# Patient Record
Sex: FEMALE | Race: BLACK OR AFRICAN AMERICAN | NOT HISPANIC OR LATINO
[De-identification: names, ages, dates, MRNs, and addresses within clinical notes are randomized per-mention and may not be internally consistent; named-entity substitution may affect disease eponyms.]

---

## 2019-05-30 ENCOUNTER — RESULT REVIEW (OUTPATIENT)
Age: 54
End: 2019-05-30

## 2019-06-18 ENCOUNTER — RESULT REVIEW (OUTPATIENT)
Age: 54
End: 2019-06-18

## 2019-07-16 PROBLEM — Z00.00 ENCOUNTER FOR PREVENTIVE HEALTH EXAMINATION: Status: ACTIVE | Noted: 2019-07-16

## 2019-07-24 ENCOUNTER — APPOINTMENT (OUTPATIENT)
Dept: OBGYN | Facility: HOSPITAL | Age: 54
End: 2019-07-24
Payer: MEDICAID

## 2019-07-24 ENCOUNTER — OUTPATIENT (OUTPATIENT)
Dept: OUTPATIENT SERVICES | Facility: HOSPITAL | Age: 54
LOS: 1 days | End: 2019-07-24

## 2019-07-24 VITALS
HEIGHT: 64 IN | SYSTOLIC BLOOD PRESSURE: 134 MMHG | BODY MASS INDEX: 45.92 KG/M2 | WEIGHT: 268.96 LBS | HEART RATE: 67 BPM | DIASTOLIC BLOOD PRESSURE: 82 MMHG

## 2019-07-24 PROCEDURE — XXXXX: CPT

## 2019-07-25 ENCOUNTER — APPOINTMENT (OUTPATIENT)
Dept: ULTRASOUND IMAGING | Facility: HOSPITAL | Age: 54
End: 2019-07-25
Payer: MEDICAID

## 2019-07-25 ENCOUNTER — OUTPATIENT (OUTPATIENT)
Dept: OUTPATIENT SERVICES | Facility: HOSPITAL | Age: 54
LOS: 1 days | End: 2019-07-25
Payer: MEDICAID

## 2019-07-25 DIAGNOSIS — D25.1 INTRAMURAL LEIOMYOMA OF UTERUS: ICD-10-CM

## 2019-07-25 PROCEDURE — 58340 CATHETER FOR HYSTEROGRAPHY: CPT

## 2019-07-25 PROCEDURE — 76831 ECHO EXAM UTERUS: CPT

## 2019-07-25 PROCEDURE — 76377 3D RENDER W/INTRP POSTPROCES: CPT

## 2019-07-25 PROCEDURE — 76831 ECHO EXAM UTERUS: CPT | Mod: 26

## 2019-07-26 DIAGNOSIS — D21.9 BENIGN NEOPLASM OF CONNECTIVE AND OTHER SOFT TISSUE, UNSPECIFIED: ICD-10-CM

## 2019-08-06 ENCOUNTER — OUTPATIENT (OUTPATIENT)
Dept: OUTPATIENT SERVICES | Facility: HOSPITAL | Age: 54
LOS: 1 days | End: 2019-08-06

## 2019-08-06 ENCOUNTER — APPOINTMENT (OUTPATIENT)
Dept: OBGYN | Facility: HOSPITAL | Age: 54
End: 2019-08-06
Payer: MEDICAID

## 2019-08-06 VITALS
BODY MASS INDEX: 46.29 KG/M2 | WEIGHT: 271.17 LBS | HEART RATE: 77 BPM | SYSTOLIC BLOOD PRESSURE: 143 MMHG | DIASTOLIC BLOOD PRESSURE: 76 MMHG | HEIGHT: 64 IN

## 2019-08-06 DIAGNOSIS — N95.0 POSTMENOPAUSAL BLEEDING: ICD-10-CM

## 2019-08-06 DIAGNOSIS — D25.1 INTRAMURAL LEIOMYOMA OF UTERUS: ICD-10-CM

## 2019-08-06 PROCEDURE — 99213 OFFICE O/P EST LOW 20 MIN: CPT | Mod: GC

## 2019-08-07 DIAGNOSIS — N95.0 POSTMENOPAUSAL BLEEDING: ICD-10-CM

## 2019-08-07 DIAGNOSIS — D25.1 INTRAMURAL LEIOMYOMA OF UTERUS: ICD-10-CM

## 2019-08-19 ENCOUNTER — TRANSCRIPTION ENCOUNTER (OUTPATIENT)
Age: 54
End: 2019-08-19

## 2019-08-19 ENCOUNTER — OUTPATIENT (OUTPATIENT)
Dept: OUTPATIENT SERVICES | Facility: HOSPITAL | Age: 54
LOS: 1 days | End: 2019-08-19
Payer: MEDICAID

## 2019-08-19 VITALS
HEART RATE: 69 BPM | WEIGHT: 270.07 LBS | TEMPERATURE: 98 F | SYSTOLIC BLOOD PRESSURE: 140 MMHG | OXYGEN SATURATION: 98 % | RESPIRATION RATE: 15 BRPM | DIASTOLIC BLOOD PRESSURE: 80 MMHG | HEIGHT: 64 IN

## 2019-08-19 DIAGNOSIS — Z98.891 HISTORY OF UTERINE SCAR FROM PREVIOUS SURGERY: Chronic | ICD-10-CM

## 2019-08-19 DIAGNOSIS — D25.1 INTRAMURAL LEIOMYOMA OF UTERUS: ICD-10-CM

## 2019-08-19 DIAGNOSIS — Z98.890 OTHER SPECIFIED POSTPROCEDURAL STATES: Chronic | ICD-10-CM

## 2019-08-19 LAB
ALBUMIN SERPL ELPH-MCNC: 3.9 G/DL — SIGNIFICANT CHANGE UP (ref 3.3–5)
ALP SERPL-CCNC: 55 U/L — SIGNIFICANT CHANGE UP (ref 40–120)
ALT FLD-CCNC: 15 U/L — SIGNIFICANT CHANGE UP (ref 4–33)
ANION GAP SERPL CALC-SCNC: 13 MMO/L — SIGNIFICANT CHANGE UP (ref 7–14)
ANION GAP SERPL CALC-SCNC: 13 MMO/L — SIGNIFICANT CHANGE UP (ref 7–14)
AST SERPL-CCNC: 15 U/L — SIGNIFICANT CHANGE UP (ref 4–32)
BASOPHILS # BLD AUTO: 0.05 K/UL — SIGNIFICANT CHANGE UP (ref 0–0.2)
BASOPHILS NFR BLD AUTO: 1.2 % — SIGNIFICANT CHANGE UP (ref 0–2)
BILIRUB DIRECT SERPL-MCNC: < 0.2 MG/DL — SIGNIFICANT CHANGE UP (ref 0.1–0.2)
BILIRUB SERPL-MCNC: 0.7 MG/DL — SIGNIFICANT CHANGE UP (ref 0.2–1.2)
BLD GP AB SCN SERPL QL: NEGATIVE — SIGNIFICANT CHANGE UP
BUN SERPL-MCNC: 12 MG/DL — SIGNIFICANT CHANGE UP (ref 7–23)
BUN SERPL-MCNC: 12 MG/DL — SIGNIFICANT CHANGE UP (ref 7–23)
CALCIUM SERPL-MCNC: 9.1 MG/DL — SIGNIFICANT CHANGE UP (ref 8.4–10.5)
CALCIUM SERPL-MCNC: 9.1 MG/DL — SIGNIFICANT CHANGE UP (ref 8.4–10.5)
CHLORIDE SERPL-SCNC: 101 MMOL/L — SIGNIFICANT CHANGE UP (ref 98–107)
CHLORIDE SERPL-SCNC: 101 MMOL/L — SIGNIFICANT CHANGE UP (ref 98–107)
CO2 SERPL-SCNC: 27 MMOL/L — SIGNIFICANT CHANGE UP (ref 22–31)
CO2 SERPL-SCNC: 27 MMOL/L — SIGNIFICANT CHANGE UP (ref 22–31)
CREAT SERPL-MCNC: 0.62 MG/DL — SIGNIFICANT CHANGE UP (ref 0.5–1.3)
CREAT SERPL-MCNC: 0.62 MG/DL — SIGNIFICANT CHANGE UP (ref 0.5–1.3)
EOSINOPHIL # BLD AUTO: 0.12 K/UL — SIGNIFICANT CHANGE UP (ref 0–0.5)
EOSINOPHIL NFR BLD AUTO: 2.9 % — SIGNIFICANT CHANGE UP (ref 0–6)
GLUCOSE SERPL-MCNC: 110 MG/DL — HIGH (ref 70–99)
GLUCOSE SERPL-MCNC: 110 MG/DL — HIGH (ref 70–99)
HBA1C BLD-MCNC: 5.6 % — SIGNIFICANT CHANGE UP (ref 4–5.6)
HCG SERPL-ACNC: < 5 MIU/ML — SIGNIFICANT CHANGE UP
HCT VFR BLD CALC: 41.4 % — SIGNIFICANT CHANGE UP (ref 34.5–45)
HCT VFR BLD CALC: 41.4 % — SIGNIFICANT CHANGE UP (ref 34.5–45)
HGB BLD-MCNC: 12.9 G/DL — SIGNIFICANT CHANGE UP (ref 11.5–15.5)
HGB BLD-MCNC: 12.9 G/DL — SIGNIFICANT CHANGE UP (ref 11.5–15.5)
IMM GRANULOCYTES NFR BLD AUTO: 0.5 % — SIGNIFICANT CHANGE UP (ref 0–1.5)
LYMPHOCYTES # BLD AUTO: 1.38 K/UL — SIGNIFICANT CHANGE UP (ref 1–3.3)
LYMPHOCYTES # BLD AUTO: 33.1 % — SIGNIFICANT CHANGE UP (ref 13–44)
MCHC RBC-ENTMCNC: 28 PG — SIGNIFICANT CHANGE UP (ref 27–34)
MCHC RBC-ENTMCNC: 28 PG — SIGNIFICANT CHANGE UP (ref 27–34)
MCHC RBC-ENTMCNC: 31.2 % — LOW (ref 32–36)
MCHC RBC-ENTMCNC: 31.2 % — LOW (ref 32–36)
MCV RBC AUTO: 89.8 FL — SIGNIFICANT CHANGE UP (ref 80–100)
MCV RBC AUTO: 89.8 FL — SIGNIFICANT CHANGE UP (ref 80–100)
MONOCYTES # BLD AUTO: 0.33 K/UL — SIGNIFICANT CHANGE UP (ref 0–0.9)
MONOCYTES NFR BLD AUTO: 7.9 % — SIGNIFICANT CHANGE UP (ref 2–14)
NEUTROPHILS # BLD AUTO: 2.27 K/UL — SIGNIFICANT CHANGE UP (ref 1.8–7.4)
NEUTROPHILS NFR BLD AUTO: 54.4 % — SIGNIFICANT CHANGE UP (ref 43–77)
NRBC # FLD: 0 K/UL — SIGNIFICANT CHANGE UP (ref 0–0)
NRBC # FLD: 0 K/UL — SIGNIFICANT CHANGE UP (ref 0–0)
PLATELET # BLD AUTO: 234 K/UL — SIGNIFICANT CHANGE UP (ref 150–400)
PLATELET # BLD AUTO: 234 K/UL — SIGNIFICANT CHANGE UP (ref 150–400)
PMV BLD: 12.5 FL — SIGNIFICANT CHANGE UP (ref 7–13)
PMV BLD: 12.5 FL — SIGNIFICANT CHANGE UP (ref 7–13)
POTASSIUM SERPL-MCNC: 3.9 MMOL/L — SIGNIFICANT CHANGE UP (ref 3.5–5.3)
POTASSIUM SERPL-MCNC: 3.9 MMOL/L — SIGNIFICANT CHANGE UP (ref 3.5–5.3)
POTASSIUM SERPL-SCNC: 3.9 MMOL/L — SIGNIFICANT CHANGE UP (ref 3.5–5.3)
POTASSIUM SERPL-SCNC: 3.9 MMOL/L — SIGNIFICANT CHANGE UP (ref 3.5–5.3)
PROT SERPL-MCNC: 7.2 G/DL — SIGNIFICANT CHANGE UP (ref 6–8.3)
RBC # BLD: 4.61 M/UL — SIGNIFICANT CHANGE UP (ref 3.8–5.2)
RBC # BLD: 4.61 M/UL — SIGNIFICANT CHANGE UP (ref 3.8–5.2)
RBC # FLD: 12.6 % — SIGNIFICANT CHANGE UP (ref 10.3–14.5)
RBC # FLD: 12.6 % — SIGNIFICANT CHANGE UP (ref 10.3–14.5)
RH IG SCN BLD-IMP: POSITIVE — SIGNIFICANT CHANGE UP
SODIUM SERPL-SCNC: 141 MMOL/L — SIGNIFICANT CHANGE UP (ref 135–145)
SODIUM SERPL-SCNC: 141 MMOL/L — SIGNIFICANT CHANGE UP (ref 135–145)
WBC # BLD: 4.17 K/UL — SIGNIFICANT CHANGE UP (ref 3.8–10.5)
WBC # BLD: 4.17 K/UL — SIGNIFICANT CHANGE UP (ref 3.8–10.5)
WBC # FLD AUTO: 4.17 K/UL — SIGNIFICANT CHANGE UP (ref 3.8–10.5)
WBC # FLD AUTO: 4.17 K/UL — SIGNIFICANT CHANGE UP (ref 3.8–10.5)

## 2019-08-19 PROCEDURE — 93010 ELECTROCARDIOGRAM REPORT: CPT

## 2019-08-19 RX ORDER — SODIUM CHLORIDE 9 MG/ML
3 INJECTION INTRAMUSCULAR; INTRAVENOUS; SUBCUTANEOUS EVERY 8 HOURS
Refills: 0 | Status: DISCONTINUED | OUTPATIENT
Start: 2019-08-20 | End: 2019-09-14

## 2019-08-19 RX ORDER — SODIUM CHLORIDE 9 MG/ML
1000 INJECTION, SOLUTION INTRAVENOUS
Refills: 0 | Status: DISCONTINUED | OUTPATIENT
Start: 2019-08-20 | End: 2019-09-14

## 2019-08-19 NOTE — H&P PST ADULT - OCCUPATION
unemployed V-Y Plasty Text: The defect edges were debeveled with a #15 scalpel blade.  Given the location of the defect, shape of the defect and the proximity to free margins an V-Y advancement flap was deemed most appropriate.  Using a sterile surgical marker, an appropriate advancement flap was drawn incorporating the defect and placing the expected incisions within the relaxed skin tension lines where possible.    The area thus outlined was incised deep to adipose tissue with a #15 scalpel blade.  The skin margins were undermined to an appropriate distance in all directions utilizing iris scissors.

## 2019-08-19 NOTE — H&P PST ADULT - HISTORY OF PRESENT ILLNESS
54 year old male presents today with recent history of vaginal bleeding in May to June 2019, denies any vaginal bleeding at this time.  Patient underwent sonogram in July that indicated   Presents today for presurgical evaluation for total laparoscopic hysterectomy bilateral salpingectomy possible exploratory laparotomy on 8/20/19 54 year old male presents today with recent history of vaginal bleeding in May to June 2019, denies any vaginal bleeding at this time, LMP 01/2018, denies any vaginal bleeding or discharge at this time.  Patient underwent Saline sonohysterogram on 7/25 showed  an ovoid heterogeneous intracavitary mass is identified in the lower uterine segment  Presents today for presurgical evaluation for total laparoscopic hysterectomy bilateral salpingectomy possible exploratory laparotomy on 8/20/19

## 2019-08-19 NOTE — ASU PATIENT PROFILE, ADULT - PMH
H/O varicose veins    High cholesterol  diet controlled  HTN (hypertension)  managed with diet and exercise  Intramural leiomyoma of uterus

## 2019-08-19 NOTE — H&P PST ADULT - NSICDXPASTMEDICALHX_GEN_ALL_CORE_FT
PAST MEDICAL HISTORY:  Intramural leiomyoma of uterus PAST MEDICAL HISTORY:  H/O varicose veins     High cholesterol diet controlled    HTN (hypertension) managed with diet and exercise    Intramural leiomyoma of uterus

## 2019-08-19 NOTE — H&P PST ADULT - NSANTHOSAYNRD_GEN_A_CORE
No. ELIECER screening performed.  STOP BANG Legend: 0-2 = LOW Risk; 3-4 = INTERMEDIATE Risk; 5-8 = HIGH Risk

## 2019-08-19 NOTE — H&P PST ADULT - NEGATIVE ENMT SYMPTOMS
no nasal congestion/no nasal obstruction/no hearing difficulty/no vertigo/no ear pain/no gum bleeding/no dry mouth/no throat pain

## 2019-08-19 NOTE — H&P PST ADULT - ATTENDING COMMENTS
GYN Attending  H&P reviewed. Agree with above assessment and plan. Plan for CHARLENE, HENRI.    SHADI Sanchez MD

## 2019-08-19 NOTE — H&P PST ADULT - NSICDXPROBLEM_GEN_ALL_CORE_FT
Problem:intramural leiomyoma of uterus     Assessment and Plan: Patient scheduled  for total laparoscopic hysterectomy bilateral salpingectomy possible exploratory laparotomy on 8/20/19  Patient provided with verbal and written presurgical instructions; verbalized understanding  with teach back.    Patient provided with famotidine for GI prophylaxis; verbalized understanding.    Patient provided with Chlorhexidine wash, verbal and written instructions reviewed. Patient demonstrated understanding with teach back.   *Medical/ Cardiac evaluation  *Case discussed with Dr sweet  *Urine specimen cup provided  *POCT glucose testing on admission Problem: intramural leiomyoma of uterus     Assessment and Plan: Patient scheduled  for total laparoscopic hysterectomy bilateral salpingectomy possible exploratory laparotomy on 8/20/19  Patient provided with verbal and written presurgical instructions; verbalized understanding  with teach back.    Patient provided with famotidine for GI prophylaxis; verbalized understanding.    Patient provided with Chlorhexidine wash, verbal and written instructions reviewed. Patient demonstrated understanding with teach back.   Medical and Cardiac evaluation requested by surgeon, in chart  Recent echo and stress test requested  Case discussed with Dr Tabares  Urine specimen cup provided  POCT glucose testing on admission  Pt instructed to stop multivitamins, LD 8/18/19 Problem: intramural leiomyoma of uterus     Assessment and Plan: Patient scheduled  for total laparoscopic hysterectomy bilateral salpingectomy possible exploratory laparotomy on 8/20/19  Patient provided with verbal and written presurgical instructions; verbalized understanding  with teach back.    Patient provided with famotidine for GI prophylaxis; verbalized understanding.    Patient provided with Chlorhexidine wash, verbal and written instructions reviewed. Patient demonstrated understanding with teach back.   Medical and Cardiac evaluation requested by surgeon, in chart  Recent echo and stress test requested  Blood work sent STAT  Case discussed with Dr Tabares  Urine specimen cup provided  POCT glucose testing on admission  Pt instructed to stop multivitamins, LD 8/18/19 Problem: intramural leiomyoma of uterus     Assessment and Plan: Patient scheduled  for total laparoscopic hysterectomy bilateral salpingectomy possible exploratory laparotomy on 8/20/19  Patient provided with verbal and written presurgical instructions; verbalized understanding  with teach back.    Patient provided with famotidine for GI prophylaxis; verbalized understanding.    Patient provided with Chlorhexidine wash, verbal and written instructions reviewed. Patient demonstrated understanding with teach back.   Medical and Cardiac evaluation requested by surgeon, medical evaluation in chart, cardiac eval requested, spoke with office- doctor is away office will email   Recent echo and stress test requested  Blood work sent STAT  Case discussed with Dr Tabares  Urine specimen cup provided  POCT glucose testing on admission  Pt instructed to stop multivitamins, LD 8/18/19

## 2019-08-19 NOTE — H&P PST ADULT - NEGATIVE GENERAL GENITOURINARY SYMPTOMS
no incontinence/no hematuria/no flank pain L/no bladder infections/no dysuria/no renal colic/no flank pain R

## 2019-08-20 ENCOUNTER — OUTPATIENT (OUTPATIENT)
Dept: OUTPATIENT SERVICES | Facility: HOSPITAL | Age: 54
LOS: 1 days | Discharge: HOME | End: 2019-08-20
Payer: MEDICAID

## 2019-08-20 ENCOUNTER — RESULT REVIEW (OUTPATIENT)
Age: 54
End: 2019-08-20

## 2019-08-20 VITALS
WEIGHT: 270.07 LBS | TEMPERATURE: 98 F | HEIGHT: 64 IN | DIASTOLIC BLOOD PRESSURE: 72 MMHG | SYSTOLIC BLOOD PRESSURE: 122 MMHG | OXYGEN SATURATION: 98 % | HEART RATE: 73 BPM | RESPIRATION RATE: 14 BRPM

## 2019-08-20 DIAGNOSIS — D25.1 INTRAMURAL LEIOMYOMA OF UTERUS: ICD-10-CM

## 2019-08-20 DIAGNOSIS — Z98.890 OTHER SPECIFIED POSTPROCEDURAL STATES: Chronic | ICD-10-CM

## 2019-08-20 DIAGNOSIS — Z98.891 HISTORY OF UTERINE SCAR FROM PREVIOUS SURGERY: Chronic | ICD-10-CM

## 2019-08-20 LAB
GLUCOSE BLDC GLUCOMTR-MCNC: 94 MG/DL — SIGNIFICANT CHANGE UP (ref 70–99)
HCG UR-SCNC: NEGATIVE — SIGNIFICANT CHANGE UP
RH IG SCN BLD-IMP: POSITIVE — SIGNIFICANT CHANGE UP
SP GR UR: 1.02 — SIGNIFICANT CHANGE UP (ref 1–1.03)

## 2019-08-20 PROCEDURE — 58570 TLH UTERUS 250 G OR LESS: CPT | Mod: GC

## 2019-08-20 PROCEDURE — 88307 TISSUE EXAM BY PATHOLOGIST: CPT | Mod: 26

## 2019-08-20 RX ORDER — HYDROMORPHONE HYDROCHLORIDE 2 MG/ML
0.5 INJECTION INTRAMUSCULAR; INTRAVENOUS; SUBCUTANEOUS
Refills: 0 | Status: DISCONTINUED | OUTPATIENT
Start: 2019-08-20 | End: 2019-08-20

## 2019-08-20 RX ORDER — OXYCODONE HYDROCHLORIDE 5 MG/1
5 TABLET ORAL ONCE
Refills: 0 | Status: DISCONTINUED | OUTPATIENT
Start: 2019-08-20 | End: 2019-08-20

## 2019-08-20 RX ORDER — ONDANSETRON 8 MG/1
4 TABLET, FILM COATED ORAL ONCE
Refills: 0 | Status: DISCONTINUED | OUTPATIENT
Start: 2019-08-20 | End: 2019-09-14

## 2019-08-20 RX ORDER — IBUPROFEN 200 MG
3 TABLET ORAL
Qty: 0 | Refills: 0 | DISCHARGE

## 2019-08-20 RX ORDER — ACETAMINOPHEN 500 MG
2 TABLET ORAL
Qty: 0 | Refills: 0 | DISCHARGE

## 2019-08-20 RX ORDER — SODIUM CHLORIDE 9 MG/ML
1000 INJECTION, SOLUTION INTRAVENOUS
Refills: 0 | Status: DISCONTINUED | OUTPATIENT
Start: 2019-08-20 | End: 2019-09-14

## 2019-08-20 RX ORDER — OXYCODONE HYDROCHLORIDE 5 MG/1
1 TABLET ORAL
Qty: 8 | Refills: 0
Start: 2019-08-20

## 2019-08-20 RX ADMIN — HYDROMORPHONE HYDROCHLORIDE 0.5 MILLIGRAM(S): 2 INJECTION INTRAMUSCULAR; INTRAVENOUS; SUBCUTANEOUS at 21:00

## 2019-08-20 RX ADMIN — HYDROMORPHONE HYDROCHLORIDE 0.5 MILLIGRAM(S): 2 INJECTION INTRAMUSCULAR; INTRAVENOUS; SUBCUTANEOUS at 21:15

## 2019-08-20 RX ADMIN — OXYCODONE HYDROCHLORIDE 5 MILLIGRAM(S): 5 TABLET ORAL at 23:35

## 2019-08-20 RX ADMIN — SODIUM CHLORIDE 125 MILLILITER(S): 9 INJECTION, SOLUTION INTRAVENOUS at 21:44

## 2019-08-20 RX ADMIN — SODIUM CHLORIDE 3 MILLILITER(S): 9 INJECTION INTRAMUSCULAR; INTRAVENOUS; SUBCUTANEOUS at 23:35

## 2019-08-20 NOTE — ASU PREOP CHECKLIST - SELECT TESTS ORDERED
CBC/BMP/EKG/stress echo/POCT Blood Glucose/Type and Screen/Type and Cross CBC/BMP/EKG/stress echo/POCT Blood Glucose/Type and Cross/Type and Screen/UCG

## 2019-08-20 NOTE — ASU DISCHARGE PLAN (ADULT/PEDIATRIC) - CARE PROVIDER_API CALL
[facility] ambulatory GYN clini,   [facility]  Oncology Building  3rd Floor  Phone: [phone]  Fax: (   )    -  Follow Up Time:

## 2019-08-20 NOTE — ASU DISCHARGE PLAN (ADULT/PEDIATRIC) - PROVIDER TOKENS
FREE:[LAST:[[facility] ambulatory GYN clini],PHONE:[phone]],FAX:[(   [phone]    -],ADDRESS:[[facility]  Oncology Building  3rd Floor]]

## 2019-08-20 NOTE — BRIEF OPERATIVE NOTE - OPERATION/FINDINGS
12 week size myomatous uterus w/ dominant fundal myoma.   Grossly normal b/l tubes, ovaries.   Grossly normal upper abdomen upon survey of upper abdomen.

## 2019-08-20 NOTE — ASU DISCHARGE PLAN (ADULT/PEDIATRIC) - CALL YOUR DOCTOR IF YOU HAVE ANY OF THE FOLLOWING:
Excessive diarrhea/Pain not relieved by Medications/Wound/Surgical Site with redness, or foul smelling discharge or pus/Bleeding that does not stop/Fever greater than (need to indicate Fahrenheit or Celsius)/Nausea and vomiting that does not stop/Unable to urinate/Increased irritability or sluggishness/Numbness, tingling, color or temperature change to extremity/Swelling that gets worse/Inability to tolerate liquids or foods

## 2019-08-20 NOTE — BRIEF OPERATIVE NOTE - NSICDXBRIEFPROCEDURE_GEN_ALL_CORE_FT
PROCEDURES:  Hysterectomy, total, laparoscopic, for uterus 250 g or less 20-Aug-2019 20:12:45  Od[place] Dorsey  Bilateral salpingectomy 20-Aug-2019 20:12:12  Od[place] Dorsey  Removal, fibroid, uterus, laparoscopic 20-Aug-2019 20:11:51  Odessa Dorsey

## 2019-08-21 VITALS
OXYGEN SATURATION: 99 % | HEART RATE: 81 BPM | DIASTOLIC BLOOD PRESSURE: 71 MMHG | SYSTOLIC BLOOD PRESSURE: 122 MMHG | RESPIRATION RATE: 13 BRPM

## 2019-08-21 PROBLEM — Z86.79 PERSONAL HISTORY OF OTHER DISEASES OF THE CIRCULATORY SYSTEM: Chronic | Status: ACTIVE | Noted: 2019-08-19

## 2019-08-21 PROBLEM — I10 ESSENTIAL (PRIMARY) HYPERTENSION: Chronic | Status: ACTIVE | Noted: 2019-08-19

## 2019-08-21 PROBLEM — E78.00 PURE HYPERCHOLESTEROLEMIA, UNSPECIFIED: Chronic | Status: ACTIVE | Noted: 2019-08-19

## 2019-08-21 PROBLEM — D25.1 INTRAMURAL LEIOMYOMA OF UTERUS: Chronic | Status: ACTIVE | Noted: 2019-08-19

## 2019-08-21 RX ADMIN — OXYCODONE HYDROCHLORIDE 5 MILLIGRAM(S): 5 TABLET ORAL at 00:05

## 2019-08-21 NOTE — PHYSICAL EXAM
[Awake] : awake [Alert] : alert [Soft] : soft [Oriented x3] : oriented to person, place, and time [Normal] : vagina [Discharge] : had a ~M discharge [Labia Majora] : labia major [Scant] : scant [White] : white [Watery] : watery [Retroversion] : retroverted [Enlarged ___ wks] : enlarged [unfilled] ~Uweeks [Uterine Adnexae] : were not tender and not enlarged [RRR, No Murmurs] : RRR, no murmurs [CTAB] : CTAB [Acute Distress] : no acute distress [Tender] : non tender [Distended] : not distended [Foul Smelling] : not foul smelling [Tenderness] : nontender

## 2019-08-26 LAB — SURGICAL PATHOLOGY STUDY: SIGNIFICANT CHANGE UP

## 2019-09-02 ENCOUNTER — EMERGENCY (EMERGENCY)
Facility: HOSPITAL | Age: 54
LOS: 1 days | Discharge: HOME | End: 2019-09-02
Attending: EMERGENCY MEDICINE | Admitting: EMERGENCY MEDICINE
Payer: MEDICAID

## 2019-09-02 VITALS
OXYGEN SATURATION: 100 % | HEART RATE: 94 BPM | TEMPERATURE: 99 F | SYSTOLIC BLOOD PRESSURE: 137 MMHG | RESPIRATION RATE: 16 BRPM | DIASTOLIC BLOOD PRESSURE: 70 MMHG

## 2019-09-02 VITALS
SYSTOLIC BLOOD PRESSURE: 139 MMHG | HEART RATE: 68 BPM | RESPIRATION RATE: 16 BRPM | OXYGEN SATURATION: 100 % | DIASTOLIC BLOOD PRESSURE: 73 MMHG

## 2019-09-02 DIAGNOSIS — Z98.890 OTHER SPECIFIED POSTPROCEDURAL STATES: Chronic | ICD-10-CM

## 2019-09-02 DIAGNOSIS — T81.31XA DISRUPTION OF EXTERNAL OPERATION (SURGICAL) WOUND, NOT ELSEWHERE CLASSIFIED, INITIAL ENCOUNTER: ICD-10-CM

## 2019-09-02 DIAGNOSIS — Z98.891 HISTORY OF UTERINE SCAR FROM PREVIOUS SURGERY: Chronic | ICD-10-CM

## 2019-09-02 LAB
ALBUMIN SERPL ELPH-MCNC: 4.3 G/DL — SIGNIFICANT CHANGE UP (ref 3.3–5)
ALP SERPL-CCNC: 63 U/L — SIGNIFICANT CHANGE UP (ref 40–120)
ALT FLD-CCNC: 11 U/L — SIGNIFICANT CHANGE UP (ref 4–33)
ANION GAP SERPL CALC-SCNC: 11 MMO/L — SIGNIFICANT CHANGE UP (ref 7–14)
AST SERPL-CCNC: 11 U/L — SIGNIFICANT CHANGE UP (ref 4–32)
BASOPHILS # BLD AUTO: 0.06 K/UL — SIGNIFICANT CHANGE UP (ref 0–0.2)
BASOPHILS NFR BLD AUTO: 0.8 % — SIGNIFICANT CHANGE UP (ref 0–2)
BILIRUB SERPL-MCNC: 0.3 MG/DL — SIGNIFICANT CHANGE UP (ref 0.2–1.2)
BUN SERPL-MCNC: 16 MG/DL — SIGNIFICANT CHANGE UP (ref 7–23)
CALCIUM SERPL-MCNC: 9.8 MG/DL — SIGNIFICANT CHANGE UP (ref 8.4–10.5)
CHLORIDE SERPL-SCNC: 101 MMOL/L — SIGNIFICANT CHANGE UP (ref 98–107)
CO2 SERPL-SCNC: 29 MMOL/L — SIGNIFICANT CHANGE UP (ref 22–31)
CREAT SERPL-MCNC: 0.82 MG/DL — SIGNIFICANT CHANGE UP (ref 0.5–1.3)
EOSINOPHIL # BLD AUTO: 0.22 K/UL — SIGNIFICANT CHANGE UP (ref 0–0.5)
EOSINOPHIL NFR BLD AUTO: 3 % — SIGNIFICANT CHANGE UP (ref 0–6)
GLUCOSE SERPL-MCNC: 107 MG/DL — HIGH (ref 70–99)
HCT VFR BLD CALC: 38.2 % — SIGNIFICANT CHANGE UP (ref 34.5–45)
HGB BLD-MCNC: 12.1 G/DL — SIGNIFICANT CHANGE UP (ref 11.5–15.5)
IMM GRANULOCYTES NFR BLD AUTO: 0.4 % — SIGNIFICANT CHANGE UP (ref 0–1.5)
LYMPHOCYTES # BLD AUTO: 1.71 K/UL — SIGNIFICANT CHANGE UP (ref 1–3.3)
LYMPHOCYTES # BLD AUTO: 23 % — SIGNIFICANT CHANGE UP (ref 13–44)
MCHC RBC-ENTMCNC: 28.1 PG — SIGNIFICANT CHANGE UP (ref 27–34)
MCHC RBC-ENTMCNC: 31.7 % — LOW (ref 32–36)
MCV RBC AUTO: 88.8 FL — SIGNIFICANT CHANGE UP (ref 80–100)
MONOCYTES # BLD AUTO: 0.63 K/UL — SIGNIFICANT CHANGE UP (ref 0–0.9)
MONOCYTES NFR BLD AUTO: 8.5 % — SIGNIFICANT CHANGE UP (ref 2–14)
NEUTROPHILS # BLD AUTO: 4.78 K/UL — SIGNIFICANT CHANGE UP (ref 1.8–7.4)
NEUTROPHILS NFR BLD AUTO: 64.3 % — SIGNIFICANT CHANGE UP (ref 43–77)
NRBC # FLD: 0 K/UL — SIGNIFICANT CHANGE UP (ref 0–0)
PLATELET # BLD AUTO: 245 K/UL — SIGNIFICANT CHANGE UP (ref 150–400)
PMV BLD: 11.7 FL — SIGNIFICANT CHANGE UP (ref 7–13)
POTASSIUM SERPL-MCNC: 4.4 MMOL/L — SIGNIFICANT CHANGE UP (ref 3.5–5.3)
POTASSIUM SERPL-SCNC: 4.4 MMOL/L — SIGNIFICANT CHANGE UP (ref 3.5–5.3)
PROT SERPL-MCNC: 7.6 G/DL — SIGNIFICANT CHANGE UP (ref 6–8.3)
RBC # BLD: 4.3 M/UL — SIGNIFICANT CHANGE UP (ref 3.8–5.2)
RBC # FLD: 12.8 % — SIGNIFICANT CHANGE UP (ref 10.3–14.5)
SODIUM SERPL-SCNC: 141 MMOL/L — SIGNIFICANT CHANGE UP (ref 135–145)
WBC # BLD: 7.43 K/UL — SIGNIFICANT CHANGE UP (ref 3.8–10.5)
WBC # FLD AUTO: 7.43 K/UL — SIGNIFICANT CHANGE UP (ref 3.8–10.5)

## 2019-09-02 PROCEDURE — 99283 EMERGENCY DEPT VISIT LOW MDM: CPT

## 2019-09-02 NOTE — ED ADULT NURSE NOTE - OBJECTIVE STATEMENT
rec'd pt aaox3 in room 9 c/o white foul smelling drainage at the surgical site on the R lower abdomen, denies fever/chills, no drainage noted at the present time, surgical opening does not appear to be fully healed, no redness/swelling noted around the site, site at the L side of abdomen healed and closed, no drainage noted. Vital signs stable. 20G IV saline lock placed in the L AC, labs drawn and sent as ordered. Pt. awaiting results, OBGYN consult and dispo.

## 2019-09-02 NOTE — ED PROVIDER NOTE - PHYSICAL EXAMINATION
general: well appearing female, no acute distress   heent: normocephalic, atraumatic   respiratory: normal work of breathing, lungs clear to auscultation bilaterally   cardiac: regular rate and rhythm   abdomen: soft, well healing LLQ and umbilical surgical sites, RLL surgical site with dehiscence, tender to palpation, no current drainage   msk: no swelling or tenderness of lower extremities   skin: warm, dry  neuro: A&Ox3

## 2019-09-02 NOTE — CONSULT NOTE ADULT - SUBJECTIVE AND OBJECTIVE BOX
HENRIETTA ORTEGATIMORE  54y  Female [id number]    HPI:  53 y/o P1 POD 13 s/p TLH B/S        Name of GYN Physician: [facility]    POB:  Csection x1 2005    Pgyn: Denies fibroids, cysts, endometriosis, STI's, Abnormal pap smears     Home meds:     Hospital Meds:   MEDICATIONS  (STANDING):    MEDICATIONS  (PRN):      Allergies    No Known Allergies    Intolerances        PAST MEDICAL & SURGICAL HISTORY:  H/O varicose veins  High cholesterol: diet controlled  HTN (hypertension): managed with diet and exercise  Intramural leiomyoma of uterus  History of tonsillectomy and adenoidectomy:   H/O  section:       FAMILY HISTORY:  FH: HTN (hypertension): parents and siblings      Social History:  Denies smoking use, drug use, alcohol use.     Vital Signs Last 24 Hrs  T(C): 37.1 (02 Sep 2019 20:26), Max: 37.1 (02 Sep 2019 20:26)  T(F): 98.8 (02 Sep 2019 20:26), Max: 98.8 (02 Sep 2019 20:26)  HR: 94 (02 Sep 2019 20:26) (94 - 94)  BP: 137/70 (02 Sep 2019 20:26) (137/70 - 137/70)  BP(mean): --  RR: 16 (02 Sep 2019 20:26) (16 - 16)  SpO2: 100% (02 Sep 2019 20:26) (100% - 100%)    Physical Exam:   General: sitting comfortably in bed, NAD   CV: RR   Lungs: Unlabored  Abd: Obese, Soft, non-tender, non-distended.  Bowel sounds present.    Port Sites: R port site with dermis exposed dermabond over it no active drainage  :  deferred  Ext: non-tender b/l, no edema     LABS:                I&O's Detail          RADIOLOGY & ADDITIONAL STUDIES: HENRIETTA ORTEGATIMORE  54y  Female [id number]    HPI:  55 y/o P1 POD 13 s/p TLH B/S for myomatous uterus, pathology was benign. Patient states she began having R sided abdominal pain and white drainage with foul odor from port site yesterday into today. Patient denies fevers, chills, shortness, of breath, palpitations, nausea, vomiting. Patient is ambulating, voiding, tolerating PO and passing flatus and having BMs.         Name of GYN Physician: [facility]    POB:  Csection x1     Pgyn: Denies fibroids, cysts, endometriosis, STI's, Abnormal pap smears     Home meds:     Hospital Meds:   MEDICATIONS  (STANDING):    MEDICATIONS  (PRN):      Allergies    No Known Allergies    Intolerances        PAST MEDICAL & SURGICAL HISTORY:  H/O varicose veins  High cholesterol: diet controlled  HTN (hypertension): managed with diet and exercise  Intramural leiomyoma of uterus  History of tonsillectomy and adenoidectomy:   H/O  section:       FAMILY HISTORY:  FH: HTN (hypertension): parents and siblings      Social History:  Denies smoking use, drug use, alcohol use.     Vital Signs Last 24 Hrs  T(C): 37.1 (02 Sep 2019 20:26), Max: 37.1 (02 Sep 2019 20:26)  T(F): 98.8 (02 Sep 2019 20:26), Max: 98.8 (02 Sep 2019 20:26)  HR: 94 (02 Sep 2019 20:26) (94 - 94)  BP: 137/70 (02 Sep 2019 20:26) (137/70 - 137/70)  BP(mean): --  RR: 16 (02 Sep 2019 20:26) (16 - 16)  SpO2: 100% (02 Sep 2019 20:26) (100% - 100%)    Physical Exam:   General: sitting comfortably in bed, NAD   CV: RR   Lungs: Unlabored  Abd: Obese, Soft, non-tender, non-distended.  Bowel sounds present.    Port Sites: R port site with superficial defect, dermis exposed dermabond over it no active drainage.   :  deferred  Ext: non-tender b/l, no edema     LABS:               12.1   7.43   )----------(  245       ( 02 Sep 2019 22:00 )               38.2

## 2019-09-02 NOTE — ED PROVIDER NOTE - PATIENT PORTAL LINK FT
You can access the FollowMyHealth Patient Portal offered by [facility] by registering at the following website: [email or web address]. By joining [facility]’s FollowMyHealth portal, you will also be able to view your health information using other applications (apps) compatible with our system.

## 2019-09-02 NOTE — ED PROVIDER NOTE - NSFOLLOWUPINSTRUCTIONS_ED_ALL_ED_FT
You were seen in the emergency department for pain and drainage at your surgical site    Please follow-up with your OB/GYN as previously scheduled     If you have any worsening symptoms, severe abdominal pain, you develop a fever or have pus draining from your wound please return to the emergency department

## 2019-09-02 NOTE — CONSULT NOTE ADULT - ATTENDING COMMENTS
pt seen and examined agree with above resident note.  Right port site no erythema, no warmth, no induration/ drainage noted.  incision not reapproximated at this time but is healing.  Unable to reapproximate as skin edge too far apart and already healing.  no defect when probed.  Pt denies fevers, chills.  CBC sent will follow up.  Pt has follow up clinic appointment on 9/11.      Lizeth Rmairez MD

## 2019-09-02 NOTE — CONSULT NOTE ADULT - ASSESSMENT
55 y/o P1 POD13 from TLH/BS with pathology benign with one day of drainage from R port site. No active drainage noted, with wound dehiscence superficial no tracking beyond dermis.

## 2019-09-02 NOTE — ED PROVIDER NOTE - PROGRESS NOTE DETAILS
OB aware of patient and at bedside. - resident Enrique Sánchez updated patient on results. has OB follow-up on the 11th. will discharge. - resident Enrique Sánchez

## 2019-09-02 NOTE — ED PROVIDER NOTE - CLINICAL SUMMARY MEDICAL DECISION MAKING FREE TEXT BOX
54F presenting with drainage from surgical site. no fever, no current drainage from site but erythematous and tender. concern for surgical site infection. plan for labs, OB consult. will reassess.

## 2019-09-02 NOTE — CONSULT NOTE ADULT - PROBLEM SELECTOR RECOMMENDATION 9
- Dermabond removed from site and tape with gauze placed over  -Follow up at Postop appt on Sept 11th     ARVIND Aguillon PGY2  seen w/ Dr. Kelly PGY4 and Dr. Ramirez

## 2019-09-02 NOTE — ED ADULT TRIAGE NOTE - CHIEF COMPLAINT QUOTE
s/p hysterectomy on August 20th for fibroids removal. Pt. c/o pain odor and drainage at surgical site. on the right side.

## 2019-09-02 NOTE — ED PROVIDER NOTE - ATTENDING CONTRIBUTION TO CARE
54F , pmh of htn,presenting with discharge from surgical wound. two weeks ago the patient had laparoscopic hysterectomy. yesterday surgical site on right lower abdomen began to have increased pain and she noticed some yellow/whitish discharge and a foul order. denies any fever, chest pain, difficulty breathing, nausea, vomiting, pain or burning with urination, pain or swelling of lower extremities. On exam: VSS afebrile, abdomen soft non tender. small open surgical wound RLQ with no tenderness fluctuance or drainage. IMP: small surgical wound dehiscence with serous drainage. Plan: CBC, GYN consult. WBC normal cleared by GYN for DC

## 2019-09-11 ENCOUNTER — APPOINTMENT (OUTPATIENT)
Dept: OBGYN | Facility: HOSPITAL | Age: 54
End: 2019-09-11
Payer: MEDICAID

## 2019-09-11 ENCOUNTER — OUTPATIENT (OUTPATIENT)
Dept: OUTPATIENT SERVICES | Facility: HOSPITAL | Age: 54
LOS: 1 days | End: 2019-09-11

## 2019-09-11 VITALS
WEIGHT: 271 LBS | BODY MASS INDEX: 46.52 KG/M2 | HEART RATE: 73 BPM | DIASTOLIC BLOOD PRESSURE: 63 MMHG | SYSTOLIC BLOOD PRESSURE: 123 MMHG

## 2019-09-11 DIAGNOSIS — Z98.891 HISTORY OF UTERINE SCAR FROM PREVIOUS SURGERY: Chronic | ICD-10-CM

## 2019-09-11 DIAGNOSIS — Z98.890 OTHER SPECIFIED POSTPROCEDURAL STATES: Chronic | ICD-10-CM

## 2019-09-11 PROCEDURE — 99024 POSTOP FOLLOW-UP VISIT: CPT

## 2019-09-12 DIAGNOSIS — Z98.890 OTHER SPECIFIED POSTPROCEDURAL STATES: ICD-10-CM

## 2019-10-02 ENCOUNTER — OUTPATIENT (OUTPATIENT)
Dept: OUTPATIENT SERVICES | Facility: HOSPITAL | Age: 54
LOS: 1 days | End: 2019-10-02

## 2019-10-02 ENCOUNTER — APPOINTMENT (OUTPATIENT)
Dept: OBGYN | Facility: HOSPITAL | Age: 54
End: 2019-10-02
Payer: MEDICAID

## 2019-10-02 VITALS
SYSTOLIC BLOOD PRESSURE: 117 MMHG | DIASTOLIC BLOOD PRESSURE: 50 MMHG | BODY MASS INDEX: 46.44 KG/M2 | WEIGHT: 272 LBS | HEART RATE: 77 BPM | HEIGHT: 64 IN

## 2019-10-02 DIAGNOSIS — Z98.890 OTHER SPECIFIED POSTPROCEDURAL STATES: ICD-10-CM

## 2019-10-02 DIAGNOSIS — Z98.891 HISTORY OF UTERINE SCAR FROM PREVIOUS SURGERY: Chronic | ICD-10-CM

## 2019-10-02 DIAGNOSIS — Z98.890 OTHER SPECIFIED POSTPROCEDURAL STATES: Chronic | ICD-10-CM

## 2019-10-02 DIAGNOSIS — Z90.710 ACQUIRED ABSENCE OF BOTH CERVIX AND UTERUS: ICD-10-CM

## 2019-10-02 PROCEDURE — ZZZZZ: CPT

## 2019-10-02 NOTE — END OF VISIT
[] : Resident [FreeTextEntry3] : Agree with above assessment, patient doing well post operative, can return to full activities\par \par Swati Beth MD

## 2019-10-02 NOTE — PHYSICAL EXAM
[Normal] : uterus [No Bleeding] : there was no active vaginal bleeding [Absent] : absent [Uterine Adnexae] : were not tender and not enlarged

## 2019-10-22 NOTE — REVIEW OF SYSTEMS
[Fever] : no fever [Chills] : no chills [Palpitations] : no palpitations [Chest Pain] : no chest pain [Dyspnea] : no shortness of breath [Cough] : no cough [Abdominal Pain] : no abdominal pain [Vomiting] : no vomiting [Constipation] : no constipation [Dysuria] : no dysuria [Frequency] : no frequency [Urgency] : no urgency [Dizziness] : no dizziness [Headache] : no headache

## 2019-10-22 NOTE — PHYSICAL EXAM
[Awake] : awake [Alert] : alert [Soft] : soft [Oriented x3] : oriented to person, place, and time [Acute Distress] : no acute distress [Tender] : non tender [Distended] : not distended

## 2019-10-28 NOTE — PHYSICAL EXAM
[Awake] : awake [Alert] : alert [Soft] : soft [Acute Distress] : no acute distress [Tender] : non tender [Distended] : not distended [H/Smegaly] : no hepatosplenomegaly

## 2019-10-28 NOTE — REVIEW OF SYSTEMS
[Chest Pain] : no chest pain [Dyspnea] : no shortness of breath [Cough] : no cough [Abdominal Pain] : no abdominal pain [Vomiting] : no vomiting [Constipation] : no constipation [Melena] : no melena [Arthralgias] : no arthralgias [Breast Pain] : no breast pain [Headache] : no headache

## 2019-10-28 NOTE — END OF VISIT
[] : Resident [FreeTextEntry3] : Patient seen at bedside with resident, denies any complaints, recovering well post operatively. patient not cleared for travel to [facility] yet does not have a doctor over there and still needs follow up here for cuff check before cleared. all questions answered\par \par \par Swati Beth MD

## 2021-10-13 NOTE — ASU PATIENT PROFILE, ADULT - TEACHING/LEARNING DEVELOPMENTAL CONSIDERATIONS
none O-T Plasty Text: The defect edges were debeveled with a #15 scalpel blade.  Given the location of the defect, shape of the defect and the proximity to free margins an O-T plasty was deemed most appropriate.  Using a sterile surgical marker, an appropriate O-T plasty was drawn incorporating the defect and placing the expected incisions within the relaxed skin tension lines where possible.    The area thus outlined was incised deep to adipose tissue with a #15 scalpel blade.  The skin margins were undermined to an appropriate distance in all directions utilizing iris scissors.

## 2022-11-29 ENCOUNTER — APPOINTMENT (RX ONLY)
Dept: URBAN - METROPOLITAN AREA CLINIC 375 | Facility: CLINIC | Age: 57
Setting detail: DERMATOLOGY
End: 2022-11-29

## 2022-11-29 DIAGNOSIS — L71.8 OTHER ROSACEA: ICD-10-CM | Status: INADEQUATELY CONTROLLED

## 2022-11-29 DIAGNOSIS — L82.1 OTHER SEBORRHEIC KERATOSIS: ICD-10-CM

## 2022-11-29 PROCEDURE — ? ADDITIONAL NOTES

## 2022-11-29 PROCEDURE — ? TREATMENT REGIMEN

## 2022-11-29 PROCEDURE — ? COUNSELING

## 2022-11-29 PROCEDURE — ? PRESCRIPTION

## 2022-11-29 PROCEDURE — ? PHOTO-DOCUMENTATION

## 2022-11-29 PROCEDURE — 99203 OFFICE O/P NEW LOW 30 MIN: CPT

## 2022-11-29 RX ORDER — DOXYCYCLINE HYCLATE 50 MG/1
CAPSULE, GELATIN COATED ORAL
Qty: 30 | Refills: 3 | Status: ERX | COMMUNITY
Start: 2022-11-29

## 2022-11-29 RX ORDER — METRONIDAZOLE 10 MG/G
GEL TOPICAL
Qty: 60 | Refills: 6 | Status: ERX | COMMUNITY
Start: 2022-11-29

## 2022-11-29 RX ORDER — SODIUM SULFACETAMIDE AND SULFUR 80; 40 MG/ML; MG/ML
LOTION TOPICAL
Qty: 1 | Refills: 5 | Status: ERX | COMMUNITY
Start: 2022-11-29

## 2022-11-29 RX ADMIN — METRONIDAZOLE: 10 GEL TOPICAL at 00:00

## 2022-11-29 RX ADMIN — SODIUM SULFACETAMIDE AND SULFUR: 80; 40 LOTION TOPICAL at 00:00

## 2022-11-29 RX ADMIN — DOXYCYCLINE HYCLATE: 50 CAPSULE, GELATIN COATED ORAL at 00:00

## 2022-11-29 ASSESSMENT — LOCATION SIMPLE DESCRIPTION DERM
LOCATION SIMPLE: CHEST
LOCATION SIMPLE: RIGHT NOSE
LOCATION SIMPLE: LEFT EYEBROW

## 2022-11-29 ASSESSMENT — LOCATION ZONE DERM
LOCATION ZONE: TRUNK
LOCATION ZONE: FACE
LOCATION ZONE: NOSE

## 2022-11-29 ASSESSMENT — LOCATION DETAILED DESCRIPTION DERM
LOCATION DETAILED: LEFT LATERAL EYEBROW
LOCATION DETAILED: RIGHT NASAL SIDEWALL
LOCATION DETAILED: LEFT MEDIAL SUPERIOR CHEST

## 2022-11-29 NOTE — PROCEDURE: ADDITIONAL NOTES
Additional Notes: Patient consent was obtained to proceed with the visit and recommended plan of care after discussion of all risks and benefits, including the risks of COVID-19 exposure.
Detail Level: Simple
Render Risk Assessment In Note?: no
Additional Notes: Patient currently using vitamin C on the site but states that it burns. Discussed sensitive skin associated with her rosacea, I advised patient to discontinue products if they cause her skin to burn / feel sensitive. Patient may continue using CeraVe.

## 2022-11-29 NOTE — HPI: DISCOLORATION
How Severe Is Your Skin Discoloration?: mild
Additional History: Patient has been putting a cream (unspecified) on for a few days which has not helped.

## 2023-01-19 NOTE — H&P PST ADULT - CURRENT SWALLOWING
Refer to the Assessment tab to view/cancel completed assessment.
(0) swallows foods and liquids w/o difficulty

## 2023-03-21 ENCOUNTER — APPOINTMENT (RX ONLY)
Dept: URBAN - METROPOLITAN AREA CLINIC 375 | Facility: CLINIC | Age: 58
Setting detail: DERMATOLOGY
End: 2023-03-21

## 2023-03-21 DIAGNOSIS — L71.8 OTHER ROSACEA: ICD-10-CM

## 2023-03-21 DIAGNOSIS — D86.3 SARCOIDOSIS OF SKIN: ICD-10-CM

## 2023-03-21 DIAGNOSIS — L81.0 POSTINFLAMMATORY HYPERPIGMENTATION: ICD-10-CM

## 2023-03-21 PROBLEM — L30.9 DERMATITIS, UNSPECIFIED: Status: ACTIVE | Noted: 2023-03-21

## 2023-03-21 PROCEDURE — ? PRESCRIPTION

## 2023-03-21 PROCEDURE — ? COUNSELING

## 2023-03-21 PROCEDURE — 99213 OFFICE O/P EST LOW 20 MIN: CPT

## 2023-03-21 PROCEDURE — ? PRESCRIPTION MEDICATION MANAGEMENT

## 2023-03-21 PROCEDURE — ? ADDITIONAL NOTES

## 2023-03-21 PROCEDURE — ? PHOTO-DOCUMENTATION

## 2023-03-21 PROCEDURE — ? ORDER PLAIN FILM

## 2023-03-21 PROCEDURE — ? TREATMENT REGIMEN

## 2023-03-21 RX ORDER — HYDROQUINONE 4 %
CREAM (GRAM) TOPICAL BID
Qty: 1 | Refills: 1 | Status: ERX | COMMUNITY
Start: 2023-03-21

## 2023-03-21 RX ORDER — DOXYCYCLINE HYCLATE 50 MG/1
CAPSULE, GELATIN COATED ORAL
Qty: 30 | Refills: 3 | Status: ERX

## 2023-03-21 RX ORDER — SODIUM SULFACETAMIDE AND SULFUR 80; 40 MG/ML; MG/ML
LOTION TOPICAL
Qty: 1 | Refills: 5 | Status: ERX

## 2023-03-21 RX ORDER — METRONIDAZOLE 7.5 MG/G
CREAM TOPICAL
Qty: 45 | Refills: 3 | Status: ERX | COMMUNITY
Start: 2023-03-21

## 2023-03-21 RX ADMIN — Medication: at 00:00

## 2023-03-21 RX ADMIN — METRONIDAZOLE: 7.5 CREAM TOPICAL at 00:00

## 2023-03-21 ASSESSMENT — LOCATION ZONE DERM
LOCATION ZONE: NOSE
LOCATION ZONE: FACE

## 2023-03-21 ASSESSMENT — LOCATION DETAILED DESCRIPTION DERM
LOCATION DETAILED: LEFT LATERAL EYEBROW
LOCATION DETAILED: RIGHT NASAL SIDEWALL
LOCATION DETAILED: RIGHT MEDIAL MALAR CHEEK

## 2023-03-21 ASSESSMENT — LOCATION SIMPLE DESCRIPTION DERM
LOCATION SIMPLE: RIGHT NOSE
LOCATION SIMPLE: RIGHT CHEEK
LOCATION SIMPLE: LEFT EYEBROW

## 2023-03-21 ASSESSMENT — SEVERITY ASSESSMENT: SEVERITY: MILD TO MODERATE

## 2023-03-21 NOTE — PROCEDURE: ORDER PLAIN FILM
Additional Body Location: None
Detail Level: Detailed
Body Location: Chest
Other Medical Necessity: Possible cutaneous sarcoidosis, r/o pulmonary involvement
Lab Facility: 0
Priority: normal
Provider: Dr. Jem Rollins MD

## 2023-03-21 NOTE — PROCEDURE: TREATMENT REGIMEN
Detail Level: Simple
Plan: She declines diagnostic biopsy today. Patient denies any history of sarcoidosis or lung disease. Will order CXR to evaluate, will refer to pulmonology as indicated. \\nPatient to keep regular follow up with PCP for management of age and risk appropriate malignancy and disease. Patient expressed understanding.

## 2023-03-21 NOTE — PROCEDURE: PRESCRIPTION MEDICATION MANAGEMENT
Render In Strict Bullet Format?: No
Continue Regimen: SulfaCleanse 8-4 8 %-4 % topical suspension, metronidazole 0.75 % topical cream, doxycycline hyclate 50 mg capsule
Detail Level: Zone
Initiate Treatment: Hydroquinone 4%

## 2023-06-15 ENCOUNTER — RX ONLY (OUTPATIENT)
Age: 58
Setting detail: RX ONLY
End: 2023-06-15

## 2023-06-15 RX ORDER — HYDROQUINONE 4 %
CREAM (GRAM) TOPICAL BID
Qty: 1 | Refills: 1 | Status: ERX

## 2023-06-19 NOTE — H&P PST ADULT - LAB RESULTS AND INTERPRETATION
What Type Of Note Output Would You Prefer (Optional)?: Standard Output
What Is The Reason For Today's Visit?: Full Body Skin Examination with No Concerns
What Is The Reason For Today's Visit? (Being Monitored For X): concerning skin lesions on a periodic basis
cbc, bmp, hga1c, hcg, t&s done

## 2024-05-09 NOTE — ED PROVIDER NOTE - OBJECTIVE STATEMENT
54F , pmh of htn,presenting with discharge from surgical wound. two weeks ago the patient had laparoscopic hysterectomy. yesterday surgical site on right lower abdomen began to have increased pain and she noticed some yellow/whitish discharge and a foul order. denies any fever, chest pain, difficulty breathing, nausea, vomiting, pain or burning with urination, pain or swelling of lower extremities. 30